# Patient Record
Sex: MALE | URBAN - METROPOLITAN AREA
[De-identification: names, ages, dates, MRNs, and addresses within clinical notes are randomized per-mention and may not be internally consistent; named-entity substitution may affect disease eponyms.]

---

## 2019-10-11 ENCOUNTER — OUTPATIENT (OUTPATIENT)
Dept: OUTPATIENT SERVICES | Facility: HOSPITAL | Age: 74
LOS: 1 days | End: 2019-10-11
Payer: SELF-PAY

## 2019-10-11 VITALS
RESPIRATION RATE: 15 BRPM | SYSTOLIC BLOOD PRESSURE: 137 MMHG | WEIGHT: 203.93 LBS | TEMPERATURE: 98 F | DIASTOLIC BLOOD PRESSURE: 86 MMHG | OXYGEN SATURATION: 99 % | HEIGHT: 69 IN | HEART RATE: 61 BPM

## 2019-10-11 DIAGNOSIS — Z98.49 CATARACT EXTRACTION STATUS, UNSPECIFIED EYE: Chronic | ICD-10-CM

## 2019-10-11 DIAGNOSIS — N64.81 PTOSIS OF BREAST: ICD-10-CM

## 2019-10-11 DIAGNOSIS — Z90.89 ACQUIRED ABSENCE OF OTHER ORGANS: Chronic | ICD-10-CM

## 2019-10-11 DIAGNOSIS — Z41.1 ENCOUNTER FOR COSMETIC SURGERY: ICD-10-CM

## 2019-10-11 DIAGNOSIS — E65 LOCALIZED ADIPOSITY: ICD-10-CM

## 2019-10-11 DIAGNOSIS — Z01.818 ENCOUNTER FOR OTHER PREPROCEDURAL EXAMINATION: ICD-10-CM

## 2019-10-11 LAB
APTT BLD: 34.5 SEC — SIGNIFICANT CHANGE UP (ref 28.5–37)
INR BLD: 1.24 RATIO — HIGH (ref 0.88–1.16)
PROTHROM AB SERPL-ACNC: 14.1 SEC — HIGH (ref 10–12.9)

## 2019-10-11 PROCEDURE — 93010 ELECTROCARDIOGRAM REPORT: CPT

## 2019-10-11 PROCEDURE — G0463: CPT

## 2019-10-11 PROCEDURE — 85730 THROMBOPLASTIN TIME PARTIAL: CPT

## 2019-10-11 PROCEDURE — 71046 X-RAY EXAM CHEST 2 VIEWS: CPT

## 2019-10-11 PROCEDURE — 86850 RBC ANTIBODY SCREEN: CPT

## 2019-10-11 PROCEDURE — 85610 PROTHROMBIN TIME: CPT

## 2019-10-11 PROCEDURE — 86900 BLOOD TYPING SEROLOGIC ABO: CPT

## 2019-10-11 PROCEDURE — 71046 X-RAY EXAM CHEST 2 VIEWS: CPT | Mod: 26

## 2019-10-11 PROCEDURE — 36415 COLL VENOUS BLD VENIPUNCTURE: CPT

## 2019-10-11 PROCEDURE — 86901 BLOOD TYPING SEROLOGIC RH(D): CPT

## 2019-10-11 PROCEDURE — 93005 ELECTROCARDIOGRAM TRACING: CPT

## 2019-10-11 NOTE — H&P PST ADULT - ASSESSMENT
75 yo M for face lift    excision gynecomastia   liposuction bilat flanks      Med cl pending w labs

## 2019-10-11 NOTE — H&P PST ADULT - NSANTHOSAYNRD_GEN_A_CORE
No. MOSES screening performed.  STOP BANG Legend: 0-2 = LOW Risk; 3-4 = INTERMEDIATE Risk; 5-8 = HIGH Risk

## 2019-10-11 NOTE — H&P PST ADULT - NSICDXPASTMEDICALHX_GEN_ALL_CORE_FT
PAST MEDICAL HISTORY:  BPH (benign prostatic hyperplasia)     Hypercholesteremia     Hypothyroid     Low testosterone     Sleep disorder     Vasopressin deficiency

## 2019-10-22 ENCOUNTER — TRANSCRIPTION ENCOUNTER (OUTPATIENT)
Age: 74
End: 2019-10-22

## 2019-10-23 ENCOUNTER — RESULT REVIEW (OUTPATIENT)
Age: 74
End: 2019-10-23

## 2019-10-23 ENCOUNTER — INPATIENT (INPATIENT)
Facility: HOSPITAL | Age: 74
LOS: 0 days | Discharge: HOME | DRG: 620 | End: 2019-10-24
Attending: SURGERY | Admitting: SURGERY
Payer: SELF-PAY

## 2019-10-23 VITALS
HEART RATE: 82 BPM | WEIGHT: 207.9 LBS | RESPIRATION RATE: 15 BRPM | DIASTOLIC BLOOD PRESSURE: 86 MMHG | HEIGHT: 69 IN | SYSTOLIC BLOOD PRESSURE: 139 MMHG | OXYGEN SATURATION: 98 % | TEMPERATURE: 98 F

## 2019-10-23 DIAGNOSIS — Z90.89 ACQUIRED ABSENCE OF OTHER ORGANS: Chronic | ICD-10-CM

## 2019-10-23 DIAGNOSIS — R23.8 OTHER SKIN CHANGES: ICD-10-CM

## 2019-10-23 DIAGNOSIS — Z98.49 CATARACT EXTRACTION STATUS, UNSPECIFIED EYE: Chronic | ICD-10-CM

## 2019-10-23 DIAGNOSIS — E65 LOCALIZED ADIPOSITY: ICD-10-CM

## 2019-10-23 PROCEDURE — 99024 POSTOP FOLLOW-UP VISIT: CPT

## 2019-10-23 PROCEDURE — 88305 TISSUE EXAM BY PATHOLOGIST: CPT | Mod: 26

## 2019-10-23 PROCEDURE — 88304 TISSUE EXAM BY PATHOLOGIST: CPT | Mod: 26

## 2019-10-23 RX ORDER — HYDROMORPHONE HYDROCHLORIDE 2 MG/ML
1 INJECTION INTRAMUSCULAR; INTRAVENOUS; SUBCUTANEOUS ONCE
Refills: 0 | Status: DISCONTINUED | OUTPATIENT
Start: 2019-10-23 | End: 2019-10-23

## 2019-10-23 RX ORDER — TAMSULOSIN HYDROCHLORIDE 0.4 MG/1
0.4 CAPSULE ORAL AT BEDTIME
Refills: 0 | Status: DISCONTINUED | OUTPATIENT
Start: 2019-10-23 | End: 2019-10-24

## 2019-10-23 RX ORDER — ACETAMINOPHEN 500 MG
1000 TABLET ORAL ONCE
Refills: 0 | Status: COMPLETED | OUTPATIENT
Start: 2019-10-23 | End: 2019-10-23

## 2019-10-23 RX ORDER — ONDANSETRON 8 MG/1
4 TABLET, FILM COATED ORAL EVERY 6 HOURS
Refills: 0 | Status: DISCONTINUED | OUTPATIENT
Start: 2019-10-23 | End: 2019-10-24

## 2019-10-23 RX ORDER — QUETIAPINE FUMARATE 200 MG/1
50 TABLET, FILM COATED ORAL DAILY
Refills: 0 | Status: DISCONTINUED | OUTPATIENT
Start: 2019-10-23 | End: 2019-10-24

## 2019-10-23 RX ORDER — HYDROMORPHONE HYDROCHLORIDE 2 MG/ML
0.5 INJECTION INTRAMUSCULAR; INTRAVENOUS; SUBCUTANEOUS
Refills: 0 | Status: DISCONTINUED | OUTPATIENT
Start: 2019-10-23 | End: 2019-10-23

## 2019-10-23 RX ORDER — ACETAMINOPHEN 500 MG
650 TABLET ORAL EVERY 6 HOURS
Refills: 0 | Status: DISCONTINUED | OUTPATIENT
Start: 2019-10-23 | End: 2019-10-24

## 2019-10-23 RX ORDER — LEVOTHYROXINE SODIUM 125 MCG
1 TABLET ORAL
Qty: 0 | Refills: 0 | DISCHARGE

## 2019-10-23 RX ORDER — MIRTAZAPINE 45 MG/1
30 TABLET, ORALLY DISINTEGRATING ORAL AT BEDTIME
Refills: 0 | Status: DISCONTINUED | OUTPATIENT
Start: 2019-10-23 | End: 2019-10-24

## 2019-10-23 RX ORDER — ATORVASTATIN CALCIUM 80 MG/1
20 TABLET, FILM COATED ORAL AT BEDTIME
Refills: 0 | Status: DISCONTINUED | OUTPATIENT
Start: 2019-10-23 | End: 2019-10-24

## 2019-10-23 RX ORDER — SODIUM CHLORIDE 9 MG/ML
1000 INJECTION, SOLUTION INTRAVENOUS
Refills: 0 | Status: DISCONTINUED | OUTPATIENT
Start: 2019-10-23 | End: 2019-10-23

## 2019-10-23 RX ORDER — QUETIAPINE FUMARATE 200 MG/1
0 TABLET, FILM COATED ORAL
Qty: 0 | Refills: 0 | DISCHARGE

## 2019-10-23 RX ORDER — CEFAZOLIN SODIUM 1 G
2000 VIAL (EA) INJECTION EVERY 8 HOURS
Refills: 0 | Status: COMPLETED | OUTPATIENT
Start: 2019-10-23 | End: 2019-10-23

## 2019-10-23 RX ORDER — OXYCODONE HYDROCHLORIDE 5 MG/1
10 TABLET ORAL ONCE
Refills: 0 | Status: DISCONTINUED | OUTPATIENT
Start: 2019-10-23 | End: 2019-10-23

## 2019-10-23 RX ORDER — METOCLOPRAMIDE HCL 10 MG
10 TABLET ORAL ONCE
Refills: 0 | Status: DISCONTINUED | OUTPATIENT
Start: 2019-10-23 | End: 2019-10-23

## 2019-10-23 RX ORDER — LEVOTHYROXINE SODIUM 125 MCG
50 TABLET ORAL DAILY
Refills: 0 | Status: DISCONTINUED | OUTPATIENT
Start: 2019-10-23 | End: 2019-10-24

## 2019-10-23 RX ORDER — TAMSULOSIN HYDROCHLORIDE 0.4 MG/1
1 CAPSULE ORAL
Qty: 0 | Refills: 0 | DISCHARGE

## 2019-10-23 RX ORDER — HYDROMORPHONE HYDROCHLORIDE 2 MG/ML
1 INJECTION INTRAMUSCULAR; INTRAVENOUS; SUBCUTANEOUS
Refills: 0 | Status: DISCONTINUED | OUTPATIENT
Start: 2019-10-23 | End: 2019-10-23

## 2019-10-23 RX ORDER — DESMOPRESSIN ACETATE 0.1 MG/1
1 TABLET ORAL
Qty: 0 | Refills: 0 | DISCHARGE

## 2019-10-23 RX ORDER — SODIUM CHLORIDE 0.65 %
0 AEROSOL, SPRAY (ML) NASAL
Qty: 0 | Refills: 0 | DISCHARGE

## 2019-10-23 RX ORDER — DESMOPRESSIN ACETATE 0.1 MG/1
0.1 TABLET ORAL
Refills: 0 | Status: DISCONTINUED | OUTPATIENT
Start: 2019-10-23 | End: 2019-10-24

## 2019-10-23 RX ORDER — CEFAZOLIN SODIUM 1 G
2000 VIAL (EA) INJECTION ONCE
Refills: 0 | Status: COMPLETED | OUTPATIENT
Start: 2019-10-23 | End: 2019-10-23

## 2019-10-23 RX ORDER — TRAMADOL HYDROCHLORIDE 50 MG/1
50 TABLET ORAL EVERY 6 HOURS
Refills: 0 | Status: DISCONTINUED | OUTPATIENT
Start: 2019-10-23 | End: 2019-10-24

## 2019-10-23 RX ORDER — ATORVASTATIN CALCIUM 80 MG/1
1 TABLET, FILM COATED ORAL
Qty: 0 | Refills: 0 | DISCHARGE

## 2019-10-23 RX ORDER — OXYCODONE HYDROCHLORIDE 5 MG/1
5 TABLET ORAL ONCE
Refills: 0 | Status: DISCONTINUED | OUTPATIENT
Start: 2019-10-23 | End: 2019-10-23

## 2019-10-23 RX ORDER — MIRTAZAPINE 45 MG/1
1 TABLET, ORALLY DISINTEGRATING ORAL
Qty: 0 | Refills: 0 | DISCHARGE

## 2019-10-23 RX ADMIN — ATORVASTATIN CALCIUM 20 MILLIGRAM(S): 80 TABLET, FILM COATED ORAL at 21:34

## 2019-10-23 RX ADMIN — Medication 1000 MILLIGRAM(S): at 22:10

## 2019-10-23 RX ADMIN — HYDROMORPHONE HYDROCHLORIDE 1 MILLIGRAM(S): 2 INJECTION INTRAMUSCULAR; INTRAVENOUS; SUBCUTANEOUS at 22:44

## 2019-10-23 RX ADMIN — Medication 100 MILLIGRAM(S): at 15:39

## 2019-10-23 RX ADMIN — DESMOPRESSIN ACETATE 0.1 MILLIGRAM(S): 0.1 TABLET ORAL at 19:00

## 2019-10-23 RX ADMIN — Medication 1000 MILLIGRAM(S): at 23:10

## 2019-10-23 RX ADMIN — HYDROMORPHONE HYDROCHLORIDE 1 MILLIGRAM(S): 2 INJECTION INTRAMUSCULAR; INTRAVENOUS; SUBCUTANEOUS at 22:59

## 2019-10-23 RX ADMIN — TRAMADOL HYDROCHLORIDE 50 MILLIGRAM(S): 50 TABLET ORAL at 22:35

## 2019-10-23 RX ADMIN — SODIUM CHLORIDE 100 MILLILITER(S): 9 INJECTION, SOLUTION INTRAVENOUS at 14:17

## 2019-10-23 RX ADMIN — TRAMADOL HYDROCHLORIDE 50 MILLIGRAM(S): 50 TABLET ORAL at 21:35

## 2019-10-23 RX ADMIN — MIRTAZAPINE 30 MILLIGRAM(S): 45 TABLET, ORALLY DISINTEGRATING ORAL at 21:34

## 2019-10-23 RX ADMIN — Medication 650 MILLIGRAM(S): at 17:10

## 2019-10-23 RX ADMIN — SODIUM CHLORIDE 60 MILLILITER(S): 9 INJECTION, SOLUTION INTRAVENOUS at 07:12

## 2019-10-23 RX ADMIN — TAMSULOSIN HYDROCHLORIDE 0.4 MILLIGRAM(S): 0.4 CAPSULE ORAL at 21:34

## 2019-10-23 RX ADMIN — Medication 650 MILLIGRAM(S): at 18:10

## 2019-10-23 NOTE — PATIENT PROFILE ADULT - NSPROHMSYMPCOND_GEN_A_NUR
cardiovascular/endocrine-takes meds for hypothyroidism,vasopressin deficiency, low testosterone./genitourinary

## 2019-10-24 ENCOUNTER — TRANSCRIPTION ENCOUNTER (OUTPATIENT)
Age: 74
End: 2019-10-24

## 2019-10-24 VITALS
SYSTOLIC BLOOD PRESSURE: 145 MMHG | DIASTOLIC BLOOD PRESSURE: 85 MMHG | RESPIRATION RATE: 18 BRPM | TEMPERATURE: 98 F | HEART RATE: 69 BPM | OXYGEN SATURATION: 95 %

## 2019-10-24 LAB — SURGICAL PATHOLOGY STUDY: SIGNIFICANT CHANGE UP

## 2019-10-24 PROCEDURE — 88304 TISSUE EXAM BY PATHOLOGIST: CPT

## 2019-10-24 PROCEDURE — 88305 TISSUE EXAM BY PATHOLOGIST: CPT

## 2019-10-24 RX ORDER — OXYCODONE AND ACETAMINOPHEN 5; 325 MG/1; MG/1
1 TABLET ORAL ONCE
Refills: 0 | Status: DISCONTINUED | OUTPATIENT
Start: 2019-10-24 | End: 2019-10-24

## 2019-10-24 RX ADMIN — TRAMADOL HYDROCHLORIDE 50 MILLIGRAM(S): 50 TABLET ORAL at 05:14

## 2019-10-24 RX ADMIN — Medication 100 MILLIGRAM(S): at 00:50

## 2019-10-24 RX ADMIN — Medication 50 MICROGRAM(S): at 06:32

## 2019-10-24 RX ADMIN — QUETIAPINE FUMARATE 50 MILLIGRAM(S): 200 TABLET, FILM COATED ORAL at 01:43

## 2019-10-24 RX ADMIN — OXYCODONE AND ACETAMINOPHEN 1 TABLET(S): 5; 325 TABLET ORAL at 02:43

## 2019-10-24 RX ADMIN — TRAMADOL HYDROCHLORIDE 50 MILLIGRAM(S): 50 TABLET ORAL at 04:14

## 2019-10-24 RX ADMIN — DESMOPRESSIN ACETATE 0.1 MILLIGRAM(S): 0.1 TABLET ORAL at 06:32

## 2019-10-24 RX ADMIN — OXYCODONE AND ACETAMINOPHEN 1 TABLET(S): 5; 325 TABLET ORAL at 01:43

## 2019-10-24 NOTE — DISCHARGE NOTE PROVIDER - CARE PROVIDER_API CALL
Nadir Garzon)  Plastic Surgery  [address] [place] [address]  Phone: [phone]  Fax: [phone]  Follow Up Time:

## 2019-10-24 NOTE — DISCHARGE NOTE PROVIDER - NSDCACTIVITY_GEN_ALL_CORE
No heavy lifting/straining/Do not drive or operate machinery/Do not make important decisions/Stairs allowed/Walking - Outdoors allowed/Walking - Indoors allowed

## 2019-10-24 NOTE — PROGRESS NOTE ADULT - SUBJECTIVE AND OBJECTIVE BOX
JASON PEREZ  [id number]    PLASTIC SURGERY POST OP CHECK / DR. CORTEZ    COMFORTABLE  NO N/V, TOLERATING REGULAR DIET     MEDICATIONS  (STANDING):  atorvastatin 20 milliGRAM(s) Oral at bedtime  ceFAZolin   IVPB 2000 milliGRAM(s) IV Intermittent every 8 hours  desmopressin 0.1 milliGRAM(s) Oral two times a day  levothyroxine 50 MICROGram(s) Oral daily  mirtazapine 30 milliGRAM(s) Oral at bedtime  QUEtiapine 50 milliGRAM(s) Oral daily  tamsulosin 0.4 milliGRAM(s) Oral at bedtime    MEDICATIONS  (PRN):  acetaminophen   Tablet .. 650 milliGRAM(s) Oral every 6 hours PRN Mild Pain (1 - 3)  ondansetron Injectable 4 milliGRAM(s) IV Push every 6 hours PRN Nausea  traMADol 50 milliGRAM(s) Oral every 6 hours PRN MODERATE TO SEVERE PAIN (4 - 10)      Vital Signs Last 24 Hrs  T(C): 36.6 (23 Oct 2019 15:22), Max: 36.6 (23 Oct 2019 14:33)  T(F): 97.8 (23 Oct 2019 15:22), Max: 97.9 (23 Oct 2019 14:33)  HR: 82 (23 Oct 2019 15:22) (77 - 92)  BP: 150/94 (23 Oct 2019 15:22) (120/72 - 150/94)  BP(mean): --  RR: 18 (23 Oct 2019 15:22) (12 - 18)  SpO2: 96% (23 Oct 2019 15:22) (96% - 100%)    VIOLA X2 SEROUS MINIMAL     BRUCE CATH CLEAR URINE      POD # 0     HEAD AND FACE : ACE BANDAGE IN PLACE, DRESSING DRY AND INTACT. X2 VIOLA IN PLACE   CHEST AND ABDOMEN: ACE BANDAGE IN PLACE , DRESSING DRY AND INTACT                                           ASSESSMENT &  PLAN:  POD # 0 S/P FULL FACE LEFT, BILATERAL BREAST AND FLANK LIPOSUCTION    STABLE , COMFORTABLE  ANCEF  MONITOR VIOLA OUT PUT  D/C BRUCE CATH IN AM  D/C PLAN HOME IN AM
Pt presents for facelift, gynecomastia, liposuction bilateral flanks. Pt presents with skin laxity of face, asymmetry, deep and superficial rhytids, severe submental laxity and lipodystrophy, moderate laxity/glandular tissue of bilateral breasts and asymmetry of breasts.  D/w pt all post op instructions including frequent ambulation, hydration, abx, medrol dose pack, vit c and iron.  D/w pt he will still have residual laxity of submental area and of breasts and may need future surgery for optimal results.  D/w pt risks, complications, and scarring of procedure. Pt and wife agree and understand.
Pt presents pod 1 s/p facelift, lipo flanks/chest, gynecomastia excision. Pt denies fevers, chills, n/v/d. Vitals normal.  Pt had moderate pain last night and received APAP and tramadol and dilaudid for relief. Denies pain this morning.  Osuna draining >100cc/hour.  Eating and drinking. Pt did not ambulate last night but will be up this morning after d/c osuna.  Pt will f/u in office this morning.

## 2019-10-24 NOTE — DISCHARGE NOTE NURSING/CASE MANAGEMENT/SOCIAL WORK - PATIENT PORTAL LINK FT
You can access the FollowMyHealth Patient Portal offered by [facility] by registering at the following website: [email or web address]. By joining [facility]’s FollowMyHealth portal, you will also be able to view your health information using other applications (apps) compatible with our system.

## 2019-10-24 NOTE — DISCHARGE NOTE PROVIDER - HOSPITAL COURSE
75 yo male presented for elective full rhytidectomy with Dr. Garzon. Pt tolerated procedure well.  Following procedure, was taken to PACU for    recovery.  Pt met all PACU criteria, and was taken to the floor. Pt did well overnight.  Watson removed in AM.  Pt voiding ambulating.  VIOLA Drains intact     along with bandages.  Pt to return to Dr Garzon office as soon as discharged.

## 2021-09-09 NOTE — H&P PST ADULT - NSSUBSTANCEUSE_GEN_ALL_CORE_SD
Patient called to get a Covid Test  Please call her at [phone] if her cell Phone does not   never used

## 2021-09-23 NOTE — PATIENT PROFILE ADULT - NSPROMUTINFOINDIVIDFT_GEN_A_NUR
PAST SURGICAL HISTORY:  H/O colonoscopy     H/O umbilical hernia repair 03/11/21    Status post tendon repair BLE- Quad tendon repair 2017    
none

## 2021-12-21 PROBLEM — R79.89 OTHER SPECIFIED ABNORMAL FINDINGS OF BLOOD CHEMISTRY: Chronic | Status: ACTIVE | Noted: 2019-10-11

## 2021-12-21 PROBLEM — G47.9 SLEEP DISORDER, UNSPECIFIED: Chronic | Status: ACTIVE | Noted: 2019-10-11

## 2021-12-21 PROBLEM — E03.9 HYPOTHYROIDISM, UNSPECIFIED: Chronic | Status: ACTIVE | Noted: 2019-10-11

## 2021-12-21 PROBLEM — N40.0 BENIGN PROSTATIC HYPERPLASIA WITHOUT LOWER URINARY TRACT SYMPTOMS: Chronic | Status: ACTIVE | Noted: 2019-10-11

## 2021-12-21 PROBLEM — E23.2 DIABETES INSIPIDUS: Chronic | Status: ACTIVE | Noted: 2019-10-11

## 2021-12-21 PROBLEM — E78.00 PURE HYPERCHOLESTEROLEMIA, UNSPECIFIED: Chronic | Status: ACTIVE | Noted: 2019-10-11

## 2022-01-06 ENCOUNTER — APPOINTMENT (OUTPATIENT)
Dept: DISASTER EMERGENCY | Facility: CLINIC | Age: 77
End: 2022-01-06

## 2022-01-06 DIAGNOSIS — Z01.812 ENCOUNTER FOR PREPROCEDURAL LABORATORY EXAMINATION: ICD-10-CM

## 2022-01-06 DIAGNOSIS — Z20.822 ENCOUNTER FOR PREPROCEDURAL LABORATORY EXAMINATION: ICD-10-CM

## 2022-01-06 PROBLEM — Z00.00 ENCOUNTER FOR PREVENTIVE HEALTH EXAMINATION: Status: ACTIVE | Noted: 2022-01-06

## 2022-01-07 LAB — SARS-COV-2 N GENE NPH QL NAA+PROBE: NOT DETECTED

## 2023-01-24 ENCOUNTER — OFFICE (OUTPATIENT)
Dept: URBAN - METROPOLITAN AREA CLINIC 8 | Facility: CLINIC | Age: 78
Setting detail: OPHTHALMOLOGY
End: 2023-01-24
Payer: MEDICARE

## 2023-01-24 DIAGNOSIS — H35.3132: ICD-10-CM

## 2023-01-24 DIAGNOSIS — H26.493: ICD-10-CM

## 2023-01-24 DIAGNOSIS — H35.371: ICD-10-CM

## 2023-01-24 DIAGNOSIS — Z96.1: ICD-10-CM

## 2023-01-24 PROCEDURE — 99213 OFFICE O/P EST LOW 20 MIN: CPT | Performed by: OPHTHALMOLOGY

## 2023-01-24 PROCEDURE — 92134 CPTRZ OPH DX IMG PST SGM RTA: CPT | Performed by: OPHTHALMOLOGY

## 2023-01-24 ASSESSMENT — SPHEQUIV_DERIVED
OS_SPHEQUIV: -0.25
OD_SPHEQUIV: -0.5
OD_SPHEQUIV: -0.375
OS_SPHEQUIV: 0.25

## 2023-01-24 ASSESSMENT — REFRACTION_CURRENTRX
OD_VPRISM_DIRECTION: SV
OD_SPHERE: +2.75
OS_VPRISM_DIRECTION: SV
OS_OVR_VA: 20/
OD_OVR_VA: 20/
OS_SPHERE: +2.75

## 2023-01-24 ASSESSMENT — REFRACTION_MANIFEST
OS_SPHERE: -0.25
OS_VA1: 20/20
OD_AXIS: 85
OS_VA2: 20/30(J2)
OS_CYLINDER: +1.00
OS_AXIS: 65
OD_ADD: +2.75
OD_VA2: 20/30(J2)
OD_VA1: 20/60-2
OD_CYLINDER: +0.50
OS_ADD: +2.75
OD_SPHERE: -0.75
OU_VA: 20/20-1

## 2023-01-24 ASSESSMENT — KERATOMETRY
OD_AXISANGLE_DEGREES: 90
OD_K2POWER_DIOPTERS: 42.75
OS_K1POWER_DIOPTERS: 42.75
OD_K1POWER_DIOPTERS: 42.75
METHOD_AUTO_MANUAL: AUTO
OS_AXISANGLE_DEGREES: 41
OS_K2POWER_DIOPTERS: 43.00

## 2023-01-24 ASSESSMENT — REFRACTION_AUTOREFRACTION
OS_AXIS: 64
OD_CYLINDER: +0.25
OD_AXIS: 85
OS_CYLINDER: +1.00
OD_SPHERE: -0.50
OS_SPHERE: -0.75

## 2023-01-24 ASSESSMENT — AXIALLENGTH_DERIVED
OD_AL: 24.0718
OS_AL: 23.9235
OD_AL: 24.0212
OS_AL: 23.7248

## 2023-01-24 ASSESSMENT — VISUAL ACUITY
OD_BCVA: 20/25
OS_BCVA: 20/70+2

## 2023-01-24 ASSESSMENT — CONFRONTATIONAL VISUAL FIELD TEST (CVF)
OS_FINDINGS: FULL
OD_FINDINGS: FULL

## 2024-01-16 ENCOUNTER — RX ONLY (RX ONLY)
Age: 79
End: 2024-01-16

## 2024-01-16 ENCOUNTER — OFFICE (OUTPATIENT)
Dept: URBAN - METROPOLITAN AREA CLINIC 8 | Facility: CLINIC | Age: 79
Setting detail: OPHTHALMOLOGY
End: 2024-01-16
Payer: MEDICARE

## 2024-01-16 DIAGNOSIS — Z96.1: ICD-10-CM

## 2024-01-16 DIAGNOSIS — H26.493: ICD-10-CM

## 2024-01-16 DIAGNOSIS — H35.371: ICD-10-CM

## 2024-01-16 DIAGNOSIS — H35.3132: ICD-10-CM

## 2024-01-16 PROCEDURE — 92014 COMPRE OPH EXAM EST PT 1/>: CPT

## 2024-01-16 PROCEDURE — 92134 CPTRZ OPH DX IMG PST SGM RTA: CPT

## 2024-01-16 ASSESSMENT — REFRACTION_CURRENTRX
OS_SPHERE: +2.75
OD_SPHERE: +2.75
OD_VPRISM_DIRECTION: SV
OS_VPRISM_DIRECTION: SV
OS_OVR_VA: 20/
OD_OVR_VA: 20/

## 2024-01-16 ASSESSMENT — REFRACTION_MANIFEST
OS_AXIS: 65
OD_SPHERE: -0.75
OD_CYLINDER: +0.50
OU_VA: 20/20-1
OD_ADD: +2.75
OD_VA2: 20/30(J2)
OS_SPHERE: -0.25
OS_CYLINDER: +1.00
OD_AXIS: 85
OS_VA1: 20/20
OD_VA1: 20/60-2
OS_ADD: +2.75
OS_VA2: 20/30(J2)

## 2024-01-16 ASSESSMENT — SPHEQUIV_DERIVED
OS_SPHEQUIV: 0.375
OD_SPHEQUIV: -0.5
OS_SPHEQUIV: 0.25
OD_SPHEQUIV: 0.125

## 2024-01-16 ASSESSMENT — REFRACTION_AUTOREFRACTION
OS_CYLINDER: -0.75
OS_SPHERE: +0.75
OD_SPHERE: +0.50
OD_CYLINDER: -0.75
OS_AXIS: 128
OD_AXIS: 095

## 2024-01-16 ASSESSMENT — CONFRONTATIONAL VISUAL FIELD TEST (CVF)
OS_FINDINGS: FULL
OD_FINDINGS: FULL

## 2025-04-15 ENCOUNTER — OFFICE (OUTPATIENT)
Dept: URBAN - METROPOLITAN AREA CLINIC 8 | Facility: CLINIC | Age: 80
Setting detail: OPHTHALMOLOGY
End: 2025-04-15
Payer: MEDICARE

## 2025-04-15 DIAGNOSIS — H35.3132: ICD-10-CM

## 2025-04-15 DIAGNOSIS — H35.371: ICD-10-CM

## 2025-04-15 DIAGNOSIS — H26.493: ICD-10-CM

## 2025-04-15 DIAGNOSIS — Z96.1: ICD-10-CM

## 2025-04-15 PROCEDURE — 92134 CPTRZ OPH DX IMG PST SGM RTA: CPT

## 2025-04-15 PROCEDURE — 92014 COMPRE OPH EXAM EST PT 1/>: CPT

## 2025-04-15 ASSESSMENT — KERATOMETRY
OD_AXISANGLE_DEGREES: 039
OD_K2POWER_DIOPTERS: 43.00
METHOD_AUTO_MANUAL: AUTO
OS_AXISANGLE_DEGREES: 056
OS_K1POWER_DIOPTERS: 42.75
OS_K2POWER_DIOPTERS: 43.25
OD_K1POWER_DIOPTERS: 42.50

## 2025-04-15 ASSESSMENT — REFRACTION_MANIFEST
OS_AXIS: 125
OD_VA1: 20/50-2
OD_VA2: 20/30(J2)
OS_VA1: 20/25
OU_VA: 20/20-1
OS_SPHERE: +0.50
OS_VA2: 20/30(J2)
OS_ADD: +2.75
OD_AXIS: 100
OS_CYLINDER: -0.25
OD_SPHERE: +0.75
OD_ADD: +2.75
OD_CYLINDER: -1.00

## 2025-04-15 ASSESSMENT — CONFRONTATIONAL VISUAL FIELD TEST (CVF)
OS_FINDINGS: FULL
OD_FINDINGS: FULL

## 2025-04-15 ASSESSMENT — REFRACTION_CURRENTRX
OD_VPRISM_DIRECTION: SV
OD_OVR_VA: 20/
OS_SPHERE: +2.75
OD_SPHERE: +2.75
OS_OVR_VA: 20/
OS_VPRISM_DIRECTION: SV

## 2025-04-15 ASSESSMENT — REFRACTION_AUTOREFRACTION
OD_SPHERE: +0.75
OS_SPHERE: +1.00
OD_CYLINDER: -1.00
OS_AXIS: 124
OD_AXIS: 099
OS_CYLINDER: -0.75

## 2025-04-15 ASSESSMENT — VISUAL ACUITY
OS_BCVA: 20/50-2
OD_BCVA: 20/25